# Patient Record
Sex: FEMALE | Race: BLACK OR AFRICAN AMERICAN | Employment: FULL TIME | URBAN - METROPOLITAN AREA
[De-identification: names, ages, dates, MRNs, and addresses within clinical notes are randomized per-mention and may not be internally consistent; named-entity substitution may affect disease eponyms.]

---

## 2019-09-17 ENCOUNTER — LAB ENCOUNTER (OUTPATIENT)
Dept: LAB | Age: 44
End: 2019-09-17
Attending: OBSTETRICS & GYNECOLOGY
Payer: COMMERCIAL

## 2019-09-17 DIAGNOSIS — N92.1 MENORRHAGIA WITH IRREGULAR CYCLE: ICD-10-CM

## 2019-09-17 PROBLEM — D21.9 FIBROIDS: Status: ACTIVE | Noted: 2019-09-17

## 2019-09-17 PROBLEM — D25.1 INTRAMURAL LEIOMYOMA OF UTERUS: Status: ACTIVE | Noted: 2019-09-17

## 2019-09-17 PROBLEM — D50.0 IRON DEFICIENCY ANEMIA DUE TO CHRONIC BLOOD LOSS: Status: ACTIVE | Noted: 2019-09-17

## 2019-09-17 PROBLEM — Z98.891 HISTORY OF C-SECTION: Status: ACTIVE | Noted: 2019-09-17

## 2019-09-17 LAB
ALBUMIN SERPL-MCNC: 4 G/DL (ref 3.4–5)
ALBUMIN/GLOB SERPL: 1.1 {RATIO} (ref 1–2)
ALP LIVER SERPL-CCNC: 56 U/L (ref 37–98)
ALT SERPL-CCNC: 21 U/L (ref 13–56)
ANION GAP SERPL CALC-SCNC: 7 MMOL/L (ref 0–18)
AST SERPL-CCNC: 20 U/L (ref 15–37)
BASOPHILS # BLD AUTO: 0.01 X10(3) UL (ref 0–0.2)
BASOPHILS NFR BLD AUTO: 0.2 %
BILIRUB SERPL-MCNC: 0.1 MG/DL (ref 0.1–2)
BUN BLD-MCNC: 16 MG/DL (ref 7–18)
BUN/CREAT SERPL: 19.8 (ref 10–20)
CALCIUM BLD-MCNC: 9.1 MG/DL (ref 8.5–10.1)
CHLORIDE SERPL-SCNC: 107 MMOL/L (ref 98–112)
CO2 SERPL-SCNC: 26 MMOL/L (ref 21–32)
CREAT BLD-MCNC: 0.81 MG/DL (ref 0.55–1.02)
DEPRECATED RDW RBC AUTO: 45.8 FL (ref 35.1–46.3)
EOSINOPHIL # BLD AUTO: 0.09 X10(3) UL (ref 0–0.7)
EOSINOPHIL NFR BLD AUTO: 1.8 %
ERYTHROCYTE [DISTWIDTH] IN BLOOD BY AUTOMATED COUNT: 12.8 % (ref 11–15)
GLOBULIN PLAS-MCNC: 3.5 G/DL (ref 2.8–4.4)
GLUCOSE BLD-MCNC: 153 MG/DL (ref 70–99)
HCT VFR BLD AUTO: 38.4 % (ref 35–48)
HGB BLD-MCNC: 12 G/DL (ref 12–16)
IMM GRANULOCYTES # BLD AUTO: 0.02 X10(3) UL (ref 0–1)
IMM GRANULOCYTES NFR BLD: 0.4 %
LYMPHOCYTES # BLD AUTO: 1.1 X10(3) UL (ref 1–4)
LYMPHOCYTES NFR BLD AUTO: 21.5 %
M PROTEIN MFR SERPL ELPH: 7.5 G/DL (ref 6.4–8.2)
MCH RBC QN AUTO: 30.3 PG (ref 26–34)
MCHC RBC AUTO-ENTMCNC: 31.3 G/DL (ref 31–37)
MCV RBC AUTO: 97 FL (ref 80–100)
MONOCYTES # BLD AUTO: 0.58 X10(3) UL (ref 0.1–1)
MONOCYTES NFR BLD AUTO: 11.4 %
NEUTROPHILS # BLD AUTO: 3.31 X10 (3) UL (ref 1.5–7.7)
NEUTROPHILS # BLD AUTO: 3.31 X10(3) UL (ref 1.5–7.7)
NEUTROPHILS NFR BLD AUTO: 64.7 %
OSMOLALITY SERPL CALC.SUM OF ELEC: 294 MOSM/KG (ref 275–295)
PLATELET # BLD AUTO: 256 10(3)UL (ref 150–450)
POTASSIUM SERPL-SCNC: 4.2 MMOL/L (ref 3.5–5.1)
RBC # BLD AUTO: 3.96 X10(6)UL (ref 3.8–5.3)
SODIUM SERPL-SCNC: 140 MMOL/L (ref 136–145)
WBC # BLD AUTO: 5.1 X10(3) UL (ref 4–11)

## 2019-09-17 PROCEDURE — 80053 COMPREHEN METABOLIC PANEL: CPT

## 2019-09-17 PROCEDURE — 85025 COMPLETE CBC W/AUTO DIFF WBC: CPT

## 2019-11-14 RX ORDER — SODIUM CHLORIDE, SODIUM LACTATE, POTASSIUM CHLORIDE, CALCIUM CHLORIDE 600; 310; 30; 20 MG/100ML; MG/100ML; MG/100ML; MG/100ML
INJECTION, SOLUTION INTRAVENOUS CONTINUOUS
Status: CANCELLED | OUTPATIENT
Start: 2019-11-14

## 2019-11-21 ENCOUNTER — ANESTHESIA EVENT (OUTPATIENT)
Dept: SURGERY | Facility: HOSPITAL | Age: 44
End: 2019-11-21
Payer: COMMERCIAL

## 2019-11-21 ENCOUNTER — ANESTHESIA (OUTPATIENT)
Dept: SURGERY | Facility: HOSPITAL | Age: 44
End: 2019-11-21
Payer: COMMERCIAL

## 2019-11-21 ENCOUNTER — HOSPITAL ENCOUNTER (OUTPATIENT)
Facility: HOSPITAL | Age: 44
Setting detail: HOSPITAL OUTPATIENT SURGERY
Discharge: HOME | End: 2019-11-21
Attending: OBSTETRICS & GYNECOLOGY | Admitting: OBSTETRICS & GYNECOLOGY
Payer: COMMERCIAL

## 2019-11-21 VITALS
OXYGEN SATURATION: 96 % | RESPIRATION RATE: 18 BRPM | TEMPERATURE: 97 F | HEART RATE: 65 BPM | DIASTOLIC BLOOD PRESSURE: 72 MMHG | SYSTOLIC BLOOD PRESSURE: 107 MMHG | WEIGHT: 185 LBS | BODY MASS INDEX: 34.93 KG/M2 | HEIGHT: 61 IN

## 2019-11-21 PROBLEM — Z98.890 S/P LAPAROSCOPY: Status: ACTIVE | Noted: 2019-11-21

## 2019-11-21 PROCEDURE — 81025 URINE PREGNANCY TEST: CPT | Performed by: OBSTETRICS & GYNECOLOGY

## 2019-11-21 PROCEDURE — 0U598ZZ DESTRUCTION OF UTERUS, VIA NATURAL OR ARTIFICIAL OPENING ENDOSCOPIC: ICD-10-PCS | Performed by: OBSTETRICS & GYNECOLOGY

## 2019-11-21 PROCEDURE — 88305 TISSUE EXAM BY PATHOLOGIST: CPT | Performed by: OBSTETRICS & GYNECOLOGY

## 2019-11-21 RX ORDER — MIDAZOLAM HYDROCHLORIDE 1 MG/ML
1 INJECTION INTRAMUSCULAR; INTRAVENOUS EVERY 5 MIN PRN
Status: DISCONTINUED | OUTPATIENT
Start: 2019-11-21 | End: 2019-11-21

## 2019-11-21 RX ORDER — ROCURONIUM BROMIDE 10 MG/ML
INJECTION, SOLUTION INTRAVENOUS AS NEEDED
Status: DISCONTINUED | OUTPATIENT
Start: 2019-11-21 | End: 2019-11-21 | Stop reason: SURG

## 2019-11-21 RX ORDER — SCOLOPAMINE TRANSDERMAL SYSTEM 1 MG/1
1 PATCH, EXTENDED RELEASE TRANSDERMAL ONCE
Status: DISCONTINUED | OUTPATIENT
Start: 2019-11-21 | End: 2019-11-21

## 2019-11-21 RX ORDER — HYDROMORPHONE HYDROCHLORIDE 1 MG/ML
0.4 INJECTION, SOLUTION INTRAMUSCULAR; INTRAVENOUS; SUBCUTANEOUS EVERY 5 MIN PRN
Status: DISCONTINUED | OUTPATIENT
Start: 2019-11-21 | End: 2019-11-21

## 2019-11-21 RX ORDER — SODIUM CHLORIDE, SODIUM LACTATE, POTASSIUM CHLORIDE, CALCIUM CHLORIDE 600; 310; 30; 20 MG/100ML; MG/100ML; MG/100ML; MG/100ML
INJECTION, SOLUTION INTRAVENOUS CONTINUOUS
Status: DISCONTINUED | OUTPATIENT
Start: 2019-11-21 | End: 2019-11-21

## 2019-11-21 RX ORDER — HYDROCODONE BITARTRATE AND ACETAMINOPHEN 5; 325 MG/1; MG/1
2 TABLET ORAL AS NEEDED
Status: DISCONTINUED | OUTPATIENT
Start: 2019-11-21 | End: 2019-11-21

## 2019-11-21 RX ORDER — BUPIVACAINE HYDROCHLORIDE 5 MG/ML
INJECTION, SOLUTION EPIDURAL; INTRACAUDAL AS NEEDED
Status: DISCONTINUED | OUTPATIENT
Start: 2019-11-21 | End: 2019-11-21 | Stop reason: HOSPADM

## 2019-11-21 RX ORDER — ONDANSETRON 2 MG/ML
4 INJECTION INTRAMUSCULAR; INTRAVENOUS AS NEEDED
Status: DISCONTINUED | OUTPATIENT
Start: 2019-11-21 | End: 2019-11-21

## 2019-11-21 RX ORDER — ACETAMINOPHEN 500 MG
1000 TABLET ORAL ONCE AS NEEDED
Status: DISCONTINUED | OUTPATIENT
Start: 2019-11-21 | End: 2019-11-21

## 2019-11-21 RX ORDER — IBUPROFEN 600 MG/1
600 TABLET ORAL EVERY 6 HOURS PRN
Qty: 30 TABLET | Refills: 1 | Status: SHIPPED | OUTPATIENT
Start: 2019-11-21

## 2019-11-21 RX ORDER — SCOLOPAMINE TRANSDERMAL SYSTEM 1 MG/1
1 PATCH, EXTENDED RELEASE TRANSDERMAL
Status: DISCONTINUED | OUTPATIENT
Start: 2019-11-21 | End: 2019-11-21

## 2019-11-21 RX ORDER — ACETAMINOPHEN 500 MG
1000 TABLET ORAL ONCE
COMMUNITY

## 2019-11-21 RX ORDER — GLYCOPYRROLATE 0.2 MG/ML
INJECTION, SOLUTION INTRAMUSCULAR; INTRAVENOUS AS NEEDED
Status: DISCONTINUED | OUTPATIENT
Start: 2019-11-21 | End: 2019-11-21 | Stop reason: SURG

## 2019-11-21 RX ORDER — HYDROCODONE BITARTRATE AND ACETAMINOPHEN 5; 325 MG/1; MG/1
1-2 TABLET ORAL EVERY 4 HOURS PRN
Qty: 15 TABLET | Refills: 0 | Status: ON HOLD | OUTPATIENT
Start: 2019-11-21 | End: 2019-12-09

## 2019-11-21 RX ORDER — LIDOCAINE HYDROCHLORIDE 10 MG/ML
INJECTION, SOLUTION EPIDURAL; INFILTRATION; INTRACAUDAL; PERINEURAL AS NEEDED
Status: DISCONTINUED | OUTPATIENT
Start: 2019-11-21 | End: 2019-11-21 | Stop reason: SURG

## 2019-11-21 RX ORDER — ONDANSETRON 2 MG/ML
INJECTION INTRAMUSCULAR; INTRAVENOUS AS NEEDED
Status: DISCONTINUED | OUTPATIENT
Start: 2019-11-21 | End: 2019-11-21 | Stop reason: SURG

## 2019-11-21 RX ORDER — HYDROCODONE BITARTRATE AND ACETAMINOPHEN 5; 325 MG/1; MG/1
1 TABLET ORAL AS NEEDED
Status: DISCONTINUED | OUTPATIENT
Start: 2019-11-21 | End: 2019-11-21

## 2019-11-21 RX ORDER — DEXAMETHASONE SODIUM PHOSPHATE 4 MG/ML
VIAL (ML) INJECTION AS NEEDED
Status: DISCONTINUED | OUTPATIENT
Start: 2019-11-21 | End: 2019-11-21 | Stop reason: SURG

## 2019-11-21 RX ORDER — LABETALOL HYDROCHLORIDE 5 MG/ML
INJECTION, SOLUTION INTRAVENOUS AS NEEDED
Status: DISCONTINUED | OUTPATIENT
Start: 2019-11-21 | End: 2019-11-21 | Stop reason: SURG

## 2019-11-21 RX ORDER — NALOXONE HYDROCHLORIDE 0.4 MG/ML
80 INJECTION, SOLUTION INTRAMUSCULAR; INTRAVENOUS; SUBCUTANEOUS AS NEEDED
Status: DISCONTINUED | OUTPATIENT
Start: 2019-11-21 | End: 2019-11-21

## 2019-11-21 RX ORDER — MEPERIDINE HYDROCHLORIDE 25 MG/ML
12.5 INJECTION INTRAMUSCULAR; INTRAVENOUS; SUBCUTANEOUS AS NEEDED
Status: DISCONTINUED | OUTPATIENT
Start: 2019-11-21 | End: 2019-11-21

## 2019-11-21 RX ORDER — NEOSTIGMINE METHYLSULFATE 1 MG/ML
INJECTION INTRAVENOUS AS NEEDED
Status: DISCONTINUED | OUTPATIENT
Start: 2019-11-21 | End: 2019-11-21 | Stop reason: SURG

## 2019-11-21 RX ORDER — ACETAMINOPHEN 500 MG
1000 TABLET ORAL ONCE
Status: DISCONTINUED | OUTPATIENT
Start: 2019-11-21 | End: 2019-11-21 | Stop reason: HOSPADM

## 2019-11-21 RX ADMIN — LABETALOL HYDROCHLORIDE 10 MG: 5 INJECTION, SOLUTION INTRAVENOUS at 10:55:00

## 2019-11-21 RX ADMIN — SODIUM CHLORIDE, SODIUM LACTATE, POTASSIUM CHLORIDE, CALCIUM CHLORIDE: 600; 310; 30; 20 INJECTION, SOLUTION INTRAVENOUS at 09:11:00

## 2019-11-21 RX ADMIN — LABETALOL HYDROCHLORIDE 10 MG: 5 INJECTION, SOLUTION INTRAVENOUS at 10:44:00

## 2019-11-21 RX ADMIN — ROCURONIUM BROMIDE 35 MG: 10 INJECTION, SOLUTION INTRAVENOUS at 09:19:00

## 2019-11-21 RX ADMIN — ONDANSETRON 4 MG: 2 INJECTION INTRAMUSCULAR; INTRAVENOUS at 10:58:00

## 2019-11-21 RX ADMIN — ROCURONIUM BROMIDE 15 MG: 10 INJECTION, SOLUTION INTRAVENOUS at 09:35:00

## 2019-11-21 RX ADMIN — LABETALOL HYDROCHLORIDE 10 MG: 5 INJECTION, SOLUTION INTRAVENOUS at 10:40:00

## 2019-11-21 RX ADMIN — ROCURONIUM BROMIDE 15 MG: 10 INJECTION, SOLUTION INTRAVENOUS at 10:11:00

## 2019-11-21 RX ADMIN — GLYCOPYRROLATE 0.4 MG: 0.2 INJECTION, SOLUTION INTRAMUSCULAR; INTRAVENOUS at 10:58:00

## 2019-11-21 RX ADMIN — DEXAMETHASONE SODIUM PHOSPHATE 8 MG: 4 MG/ML VIAL (ML) INJECTION at 09:21:00

## 2019-11-21 RX ADMIN — SODIUM CHLORIDE, SODIUM LACTATE, POTASSIUM CHLORIDE, CALCIUM CHLORIDE: 600; 310; 30; 20 INJECTION, SOLUTION INTRAVENOUS at 11:04:00

## 2019-11-21 RX ADMIN — NEOSTIGMINE METHYLSULFATE 3 MG: 1 INJECTION INTRAVENOUS at 10:58:00

## 2019-11-21 RX ADMIN — LIDOCAINE HYDROCHLORIDE 50 MG: 10 INJECTION, SOLUTION EPIDURAL; INFILTRATION; INTRACAUDAL; PERINEURAL at 09:19:00

## 2019-11-21 NOTE — H&P
[address] [place] [address] Patient Status:  Hospital Outpatient Surgery    10/8/1975 MRN [id number]   [facility] SURGERY Attending Elizabeth Clay MD   Hosp Day # 0 PCP Gin Smith MD     SUBJECTIVE: menstrual period 11/09/2019. No intake or output data in the 24 hours ending 11/20/19 2118    Physical Exam:  General: Alert, orientated x3. .  Vital Signs:  Height 61\", weight 185 lb, last menstrual period 11/09/2019. . VSS Afebrile  HEENT: Exam is unrem x10(3) uL 1.10    Monocyte Absolute  0.10 - 1.00 x10(3) uL 0.58    Eosinophil Absolute  0.00 - 0.70 x10(3) uL 0.09    Basophil Absolute  0.00 - 0.20 x10(3) uL 0.01    Immature Granulocyte Absolute  0.00 - 1.00 x10(3) uL 0.02    Neutrophil %  % 64.7    Lymp

## 2019-11-21 NOTE — OPERATIVE REPORT
OPERATIVE REPORT      PREOPERATIVE DIAGNOSIS:     1.      Fibroid uterus - intramural and subserosal     2.      Bloating  3.       Endometrial Polyp  POSTOPERATIVE DIAGNOSIS:     1.      Fibroid uterus - submucosal, transmural, intramural and subserosal.  dependent drainage. An exam under anesthesia reveals a SOPHIA uterus globular fibroid uterus. A weighted speculum was placed in the vagina and the cervix was grasped with a single toothed tenaculum.  Local anesthetic was placed in the paracervical region using uterus with the previously mentioned fibroids.  From side to side the uterus was mapped and all pertinent findings were noted.  The myomas were categorized by type and measured in 3 separate perpendicular planes, both transverse, sagittal, and anterior-pos The base of the fibroid then was bleeding from an area that was hit inadvertently with the lap scopic trocar and this was treated to a depth of 2 cm and deployed 1 cm the total amount of energy just applied to this fibroid was 7-1/2 minutes.     Fibroid was well.  The Perez and uterine manipulator were removed from the pelvis without bleeding.  The patient was moved to the supine position, awakened, and transferred to the recovery room in stable condition.      COMPLICATIONS:  None.

## 2019-11-21 NOTE — ANESTHESIA PROCEDURE NOTES
Airway  Urgency: elective      General Information and Staff    Patient location during procedure: OR  Anesthesiologist: Elena Martin MD  Resident/CRNA: Caitlin Kirkpatrick CRNA  Performed: CRNA     Indications and Patient Condition  Indications for

## 2019-11-21 NOTE — ANESTHESIA POSTPROCEDURE EVALUATION
[address] [facility] Patient Status:  Hospital Outpatient Surgery   Age/Gender 45year old female MRN [id number]   [facility] SURGERY Attending Mary Grace Garrett MD   Hosp Day # 0 PCP Jose Enrique Marrero MD       Anesthesia

## 2019-11-21 NOTE — INTERVAL H&P NOTE
Patient requesting Hysteroscopy, D&C to remove a polyp. The endometrial biopsy pathology report suggested the tissue looked polypoid. No polyps noted on ultrasound. Agreed to look inside uterus and do a D&C. RIsks and complications reviewed.

## 2019-11-21 NOTE — DISCHARGE SUMMARY
[facility]  Discharge Summary    [address] Patient Status:  Hospital Outpatient Surgery    10/8/1975 MRN [id number]   Location [facility] Attending Javed Parker MD   Hosp Day # 0 BRANDO Vann heavy lifting    Alona Prather  11/21/2019  11:43 AM

## 2019-11-21 NOTE — ANESTHESIA PREPROCEDURE EVALUATION
PRE-OP EVALUATION    Patient Name: Esperanza Alcantara    Pre-op Diagnosis: Fibroid Uterus, Menorrhagia    Procedure(s):  Laparoscopic Radiofrequency Ablation of Fibroids Andrew Lowry    Surgeon(s) and Role:     Kareem Cordova MD - Primary    Pre-op 09/17/2019    MCH 30.3 09/17/2019    MCHC 31.3 09/17/2019    RDW 12.8 09/17/2019    .0 09/17/2019     Lab Results   Component Value Date     09/17/2019    K 4.2 09/17/2019     09/17/2019    CO2 26.0 09/17/2019    BUN 16 09/17/2019    CRE

## 2019-11-25 PROCEDURE — 87147 CULTURE TYPE IMMUNOLOGIC: CPT | Performed by: OBSTETRICS & GYNECOLOGY

## 2019-11-25 PROCEDURE — 87086 URINE CULTURE/COLONY COUNT: CPT | Performed by: OBSTETRICS & GYNECOLOGY

## 2019-11-29 ENCOUNTER — HOSPITAL ENCOUNTER (EMERGENCY)
Facility: HOSPITAL | Age: 44
Discharge: STILL ADMITTED OR ADMINISTRATIVE | DRG: 983 | End: 2019-11-30
Payer: COMMERCIAL

## 2019-11-29 DIAGNOSIS — Z90.710 S/P HYSTERECTOMY: ICD-10-CM

## 2019-11-29 DIAGNOSIS — R50.9 FEVER, UNSPECIFIED FEVER CAUSE: Primary | ICD-10-CM

## 2019-11-30 ENCOUNTER — HOSPITAL ENCOUNTER (INPATIENT)
Facility: HOSPITAL | Age: 44
LOS: 9 days | Discharge: HOME | DRG: 983 | End: 2019-12-09
Attending: OBSTETRICS & GYNECOLOGY | Admitting: OBSTETRICS & GYNECOLOGY
Payer: COMMERCIAL

## 2019-11-30 PROBLEM — R50.9 FEVER: Status: ACTIVE | Noted: 2019-11-30

## 2019-11-30 PROCEDURE — 87086 URINE CULTURE/COLONY COUNT: CPT | Performed by: OBSTETRICS & GYNECOLOGY

## 2019-11-30 PROCEDURE — 82728 ASSAY OF FERRITIN: CPT | Performed by: OBSTETRICS & GYNECOLOGY

## 2019-11-30 PROCEDURE — 81001 URINALYSIS AUTO W/SCOPE: CPT | Performed by: OBSTETRICS & GYNECOLOGY

## 2019-11-30 PROCEDURE — 80053 COMPREHEN METABOLIC PANEL: CPT | Performed by: OBSTETRICS & GYNECOLOGY

## 2019-11-30 PROCEDURE — 85007 BL SMEAR W/DIFF WBC COUNT: CPT | Performed by: OBSTETRICS & GYNECOLOGY

## 2019-11-30 PROCEDURE — 85025 COMPLETE CBC W/AUTO DIFF WBC: CPT | Performed by: OBSTETRICS & GYNECOLOGY

## 2019-11-30 PROCEDURE — 85027 COMPLETE CBC AUTOMATED: CPT | Performed by: OBSTETRICS & GYNECOLOGY

## 2019-11-30 RX ORDER — ACETAMINOPHEN 500 MG
1000 TABLET ORAL EVERY 6 HOURS PRN
Status: DISCONTINUED | OUTPATIENT
Start: 2019-11-30 | End: 2019-12-05 | Stop reason: HOSPADM

## 2019-11-30 RX ORDER — DEXTROSE, SODIUM CHLORIDE, SODIUM LACTATE, POTASSIUM CHLORIDE, AND CALCIUM CHLORIDE 5; .6; .31; .03; .02 G/100ML; G/100ML; G/100ML; G/100ML; G/100ML
INJECTION, SOLUTION INTRAVENOUS CONTINUOUS
Status: DISCONTINUED | OUTPATIENT
Start: 2019-11-30 | End: 2019-12-05 | Stop reason: HOSPADM

## 2019-11-30 RX ORDER — SODIUM CHLORIDE 9 MG/ML
INJECTION, SOLUTION INTRAVENOUS CONTINUOUS
Status: ACTIVE | OUTPATIENT
Start: 2019-11-30 | End: 2019-11-30

## 2019-11-30 RX ORDER — HYDROMORPHONE HYDROCHLORIDE 1 MG/ML
0.5 INJECTION, SOLUTION INTRAMUSCULAR; INTRAVENOUS; SUBCUTANEOUS EVERY 30 MIN PRN
Status: CANCELLED | OUTPATIENT
Start: 2019-11-30 | End: 2019-11-30

## 2019-11-30 RX ORDER — ACETAMINOPHEN 500 MG
500 TABLET ORAL EVERY 6 HOURS PRN
Status: DISCONTINUED | OUTPATIENT
Start: 2019-11-30 | End: 2019-12-05 | Stop reason: HOSPADM

## 2019-11-30 RX ORDER — SODIUM CHLORIDE 9 MG/ML
INJECTION, SOLUTION INTRAVENOUS CONTINUOUS
Status: CANCELLED | OUTPATIENT
Start: 2019-11-30 | End: 2019-11-30

## 2019-11-30 RX ORDER — HYDROMORPHONE HYDROCHLORIDE 1 MG/ML
0.5 INJECTION, SOLUTION INTRAMUSCULAR; INTRAVENOUS; SUBCUTANEOUS EVERY 30 MIN PRN
Status: ACTIVE | OUTPATIENT
Start: 2019-11-30 | End: 2019-11-30

## 2019-11-30 RX ORDER — ONDANSETRON 2 MG/ML
4 INJECTION INTRAMUSCULAR; INTRAVENOUS EVERY 4 HOURS PRN
Status: CANCELLED | OUTPATIENT
Start: 2019-11-30

## 2019-11-30 RX ORDER — ONDANSETRON 2 MG/ML
4 INJECTION INTRAMUSCULAR; INTRAVENOUS EVERY 4 HOURS PRN
Status: DISCONTINUED | OUTPATIENT
Start: 2019-11-30 | End: 2019-12-05 | Stop reason: HOSPADM

## 2019-11-30 RX ORDER — CLINDAMYCIN PHOSPHATE 900 MG/50ML
900 INJECTION INTRAVENOUS EVERY 8 HOURS
Status: DISCONTINUED | OUTPATIENT
Start: 2019-11-30 | End: 2019-11-30

## 2019-11-30 NOTE — PROGRESS NOTES
NURSING ADMISSION NOTE      Patient admitted via [address] from [facility].   Oriented to room. Safety precautions initiated. Bed in low position. Call light in reach. Dr. Marlyn Mccoy notified of pt arrival.   Verbal admission orders received.      Pt

## 2019-11-30 NOTE — PROGRESS NOTES
Admit Note    45year old black female G 1 P 1001 LMP:  ? Triaged in [address] after calling to tell me she had a temperature of 104 F. Work up there showed an elevated WBC with a left shift. Imaging of the pelvis did not reveal an abscess.   I accepted >/= 126 mg/dL: Provisional Diagnosis of Diabetes   70 - 99 mg/dL [facility]     Urea Nitrogen 8 7 - 18 mg/dL [facility]     BUN/Creat Ratio 9.3 (L) 12.0 - 20.0 [facility]     Creatinine 0.86 0.51 - 1.17 mg/dL [facility] Total Out     200 0  Total Out     I/O Net    169 -200 0  I/O Net        Imaging not available in Epic. Told it was nonspecific. I requested triple antibiotics which was given there before transfer.         Scheduled  Scheduled   Medication Order 11/2 320 mg (rounded from 315.5 mg = 5 mg/kg × 63.1 kg Adjusted weight), IVPB, Administer over 60 Minutes, ONCE, Sat 11/30/19 at 0030, For 1 dose, Over 1 hour.  Check for orders for gentamicin levels,        0115Started^11/30 0115^Provider: Lul Mcnair RN^D

## 2019-11-30 NOTE — CONSULTS
INFECTIOUS DISEASE CONSULT NOTE    Matias Lewis Patient Status:  Inpatient    10/8/1975 MRN [id number]   [facility] 3NW-A Attending Doni Pizano MD   Hosp Day # 0 PCP Oral, Q6H PRN **OR** acetaminophen (TYLENOL EXTRA STRENGTH) tab 1,000 mg, 1,000 mg, Oral, Q6H PRN  •  Gentamicin Sulfate (GARAMYCIN) 440 mg in sodium chloride 0.9% 100 mL IVPB, 7 mg/kg (Adjusted), Intravenous, Once    Review of Systems:    Completed.  See p .0   NEUT 77   LYMPH 4   MON 6   EOS 0     Recent Labs   Lab 11/30/19  0528   *   BUN 7   CREATSERUM 0.75   GFRAA 114   GFRNAA 99   CA 8.5   ALB 2.4*      K 3.9      CO2 26.0   ALKPHO 106*   AST 16   ALT 24   BILT 0.4   TP 6.6

## 2019-11-30 NOTE — CONSULTS
[address] [facility] Dosing Service    Initial Pharmacokinetic Consult for Aminoglycoside Dosing      Maggie Trevino is a 45year old female who is being treated for fever of unknown origin. Pharmacy has been asked to dose gentamicin  by Dr. Nickie Dance.     She i in her care.     Isidoro Smith, PharmD  11/30/2019  8:10 AM

## 2019-11-30 NOTE — PLAN OF CARE
Problem: Patient/Family Goals  Goal: Patient/Family Long Term Goal  Description  Patient's Long Term Goal: DISCHARGE HOME    Interventions:  - PAIN TOLERABLE  -TOLERATING DIET  -RETURN TO PREVIOUS ADL'S  - See additional Care Plan goals for specific inte preferences  - Enhance eating environment  Outcome: Progressing  Goal: Achieves appropriate nutritional intake (bariatric)  Description  INTERVENTIONS:  - Monitor for over-consumption  - Identify factors contributing to increased intake, treat as appropria

## 2019-11-30 NOTE — PROGRESS NOTES
PATIENT HAS IV FLUIDS AT [facility], IV ZOSYN SCHEDULED, ON ROOM AIR, TOLERATING A REGULAR DIET, VOIDING WELL, HAS SOME REMAINING VAGINAL BLEEDING FROM ORIGINAL SURGERY, DENIES PAIN, INCISIONS ARE C/D/I, URINE SENT FOR CULTURE, AMBULATES INDEPENDENTLY, TEMP MAX 9

## 2019-11-30 NOTE — H&P
[facility]  History & Physical - Interim     Kirstie Martinez Patient Status:  Inpatient    10/8/1975 MRN [id number]   [facility] 3NW-A Attending Monica Chaney MD   Hosp Day # 0 PCP Perla Menendez MD     SUBJECTIVE:    Re Date   •      • LAPAROSCOPIC ACESSA PROCEDURE N/A 2019    Performed by Saumya Nina MD at [address]       Past OB History:  OB History    Para Term  AB Living   1 1 1     1   SAB TAB Ectopic Multiple Live Births murmur. Abdomen:  Good bowel sounds.,  Incisions healing well. Soft, round,  non-distended, uterus tender with deep palpation put not rebound or increase in warmth. Pelvic:deferred  Extremities:  No lower extremity edema noted.   Without clubbing or cya

## 2019-12-01 PROCEDURE — 85025 COMPLETE CBC W/AUTO DIFF WBC: CPT | Performed by: OBSTETRICS & GYNECOLOGY

## 2019-12-01 NOTE — PROGRESS NOTES
[facility]  Progress Note    Alma Delia Alvarado Patient Status:  Inpatient    10/8/1975 MRN [id number]   [facility] 3NW-A Attending Mary Grace Garrett MD   Hosp Day # 1 PCP Jose Enrique Marrero MD     Subjective:  POD: 10 (HD:  2) - f due to chronic blood loss     Fibroid uterus - submucous, transmural, intramural and subserosal.      Menorrhagia with irregular cycle     S/P Op. laparoscopy - RF ablation of fiborids, HS, D&C 2019     Fever        Pinkey Catherine  12/1/2019  10:25 AM

## 2019-12-01 NOTE — PROGRESS NOTES
INFECTIOUS DISEASE PROGRESS NOTE    Pedro Pablo Lomeli Patient Status:  Inpatient    10/8/1975 MRN [id number]   [facility] 3NW-A Attending Kimmie Durán MD   Hosp Day # 1 PCP 268.0 272.0   NEUT 77  --    LYMPH 4  --    MON 6  --    EOS 0  --      Recent Labs   Lab 11/30/19  0528   *   BUN 7   CREATSERUM 0.75   GFRAA 114   GFRNAA 99   CA 8.5   ALB 2.4*      K 3.9      CO2 26.0   ALKPHO 106*   AST 16   ALT 24

## 2019-12-01 NOTE — PROGRESS NOTES
PATIENT HAS IV FLUIDS AT [facility], IV ZOSYN SCHEDULED, AMBULATES INDEPENDENTLY, TOLERATING A REGULAR DIET, ON ROOM AIR, VOIDING WELL, STILL HAS SOME VAGINAL BLEEDING, INCISIONS ARE C/D/I, DENIES PAIN, HAD INTERMITTENT NAUSEA THIS MORNING-ZOFRAN GIVEN, AFEBRILE.

## 2019-12-01 NOTE — PLAN OF CARE
Problem: Patient/Family Goals  Goal: Patient/Family Long Term Goal  Description  Patient's Long Term Goal: DISCHARGE HOME    Interventions:  - PAIN TOLERABLE  -TOLERATING DIET  -RETURN TO PREVIOUS ADL'S  - See additional Care Plan goals for specific inte preferences  - Enhance eating environment  Outcome: Progressing  Goal: Achieves appropriate nutritional intake (bariatric)  Description  INTERVENTIONS:  - Monitor for over-consumption  - Identify factors contributing to increased intake, treat as appropria severity of pain and evaluate response  - Implement non-pharmacological measures as appropriate and evaluate response  - Consider cultural and social influences on pain and pain management  - Manage/alleviate anxiety  - Utilize distraction and/or relaxatio

## 2019-12-02 ENCOUNTER — APPOINTMENT (OUTPATIENT)
Dept: CT IMAGING | Facility: HOSPITAL | Age: 44
DRG: 983 | End: 2019-12-02
Attending: INTERNAL MEDICINE
Payer: COMMERCIAL

## 2019-12-02 PROCEDURE — 36410 VNPNXR 3YR/> PHY/QHP DX/THER: CPT

## 2019-12-02 PROCEDURE — 85025 COMPLETE CBC W/AUTO DIFF WBC: CPT | Performed by: INTERNAL MEDICINE

## 2019-12-02 PROCEDURE — 80048 BASIC METABOLIC PNL TOTAL CA: CPT | Performed by: INTERNAL MEDICINE

## 2019-12-02 PROCEDURE — 74177 CT ABD & PELVIS W/CONTRAST: CPT | Performed by: INTERNAL MEDICINE

## 2019-12-02 PROCEDURE — 76937 US GUIDE VASCULAR ACCESS: CPT

## 2019-12-02 PROCEDURE — 86140 C-REACTIVE PROTEIN: CPT | Performed by: INTERNAL MEDICINE

## 2019-12-02 RX ORDER — SODIUM CHLORIDE 0.9 % (FLUSH) 0.9 %
10 SYRINGE (ML) INJECTION EVERY 12 HOURS
Status: DISCONTINUED | OUTPATIENT
Start: 2019-12-02 | End: 2019-12-05 | Stop reason: HOSPADM

## 2019-12-02 NOTE — PROGRESS NOTES
INFECTIOUS DISEASE PROGRESS NOTE    Kirstie Martinez Patient Status:  Inpatient    10/8/1975 MRN [id number]   [facility] 3NW-A Attending Monica Chaney MD   Hosp Day # 2 PCP 7.3*   HCT 23.6* 22.7* 23.3*   MCV 94.8 95.8 94.3   MCH 30.5 29.5 29.6   MCHC 32.2 30.8* 31.3   RDW 13.1 13.4 13.2   NEPRELIM 16.73* 13.09* 12.25*   WBC 20.5* 15.9* 15.0*   .0 272.0 347.0   NEUT 77  --   --    LYMPH 4  --   --    MON 6  --   --    E

## 2019-12-02 NOTE — PLAN OF CARE
Problem: GASTROINTESTINAL - ADULT  Goal: Minimal or absence of nausea and vomiting  Description  INTERVENTIONS:  - Maintain adequate hydration with IV or PO as ordered and tolerated  - Nasogastric tube to low intermittent suction as ordered  - Evaluate e bladder scan as needed  - Follow urinary retention protocol/standard of care  - Consider collaborating with pharmacy to review patient's medication profile  - Implement strategies to promote bladder emptying  Outcome: Progressing     Problem: SKIN/TISSUE I period  Description  INTERVENTIONS  - Monitor WBC  - Administer growth factors as ordered  - Implement neutropenic guidelines  Outcome: Progressing     Problem: Patient/Family Goals  Goal: Patient/Family Long Term Goal  Description  Patient's [address] [facility]

## 2019-12-02 NOTE — PROGRESS NOTES
Pt resting comfortably at this time. Complains of intermittent mild abdominal pain. Declines medication at this time. Denies nausea this time. Complains of decreased appetite. States last BM was today. Voiding.  Complains of blood tinged mucous discharge to

## 2019-12-02 NOTE — PAYOR COMM NOTE
--------------  ADMISSION REVIEW     Payor: [address] [address]  Subscriber #:  [id number]  Authorization Number: [id number]    Admit date: 11/30/19  Admit time: Jn Collins 61       Admitting Physician: Alicia Morrison MD  Attending Physician:  Marialuisa Licea 11/30/2019 1157  Last data filed at 11/30/2019 1151  Gross per 24 hour   Intake 569 ml   Output 400 ml   Net 169 ml       Physical Exam:    Vital Signs:  Blood pressure 100/58, pulse 100, temperature 99.4 °F (37.4 °C), temperature source Oral, resp.  rate 1 7. 0  HCT 22.7  RBC 2.37  NEUTROPHILS 13.09      OB/GYN -     Subjective:  POD: 10 (HD:  2) - feels good. Now vaginal discharge no lighter and more mucous than bloody. No orthostatic symptoms despite Hb = 7. Tolerating diet well. (+) flatulence.   Selestino Lara

## 2019-12-02 NOTE — PLAN OF CARE
Patient is alert and orientated x4. RA. Voids. No N/V diet tolerated, decreased appetite. Lory pad and mesh panties in place, with small amount of blood tinged discharge. Lap sites x4 with skin glue. Denies pain. Up ad sam. IV fluids/abx infusing.  Plan of (undernourished)  Description  INTERVENTIONS:  - Monitor percentage of each meal consumed  - Identify factors contributing to decreased intake, treat as appropriate  - Assist with meals as needed  - Monitor I&O, WT and lab values  - Obtain nutritional cons Initiate interventions, skin care algorithm/standards of care as needed  Outcome: Progressing  Goal: Incision(s), wounds(s) or drain site(s) healing without S/S of infection  Description  INTERVENTIONS:  - Assess and document risk factors for pressure ulce

## 2019-12-02 NOTE — CDS QUERY
Clinical Significance – Lab Results Associated with Blood Loss  CLINICAL DOCUMENTATION CLARIFICATION FORM  Dear Doctor:  Clinical information (provided below) suggests blood loss and abnormal blood counts.  For accurate ICD-10-CM code assignment to reflect

## 2019-12-02 NOTE — PROGRESS NOTES
[facility]  Progress Note    Dhaval Cohen Patient Status:  Inpatient    10/8/1975 MRN [id number]   [facility] 3NW-A Attending Brandon Rider MD   Hosp Day # 2 PCP Howie Pride MD     Subjective:  POD: 11(HD #3)- - fe

## 2019-12-03 PROCEDURE — 85025 COMPLETE CBC W/AUTO DIFF WBC: CPT | Performed by: INTERNAL MEDICINE

## 2019-12-03 PROCEDURE — 86140 C-REACTIVE PROTEIN: CPT | Performed by: INTERNAL MEDICINE

## 2019-12-03 RX ORDER — CHOLECALCIFEROL (VITAMIN D3) 25 MCG
1 TABLET,CHEWABLE ORAL DAILY
Status: DISCONTINUED | OUTPATIENT
Start: 2019-12-03 | End: 2019-12-05 | Stop reason: HOSPADM

## 2019-12-03 NOTE — PROGRESS NOTES
INFECTIOUS DISEASE PROGRESS NOTE    Niko Tyler Patient Status:  Inpatient    10/8/1975 MRN [id number]   [facility] 3NW-A Attending Kevin Diggs MD   Hosp Day # 3 PCP 11/30/19  0528 12/01/19  0541 12/02/19  0534 12/03/19  0529   RBC 2.49* 2.37* 2.47* 2.45*   HGB 7.6* 7.0* 7.3* 7.1*   HCT 23.6* 22.7* 23.3* 23.0*   MCV 94.8 95.8 94.3 93.9   MCH 30.5 29.5 29.6 29.0   MCHC 32.2 30.8* 31.3 30.9*   RDW 13.1 13.4 13.2 13.6   N

## 2019-12-03 NOTE — PAYOR COMM NOTE
--------------  CONTINUED STAY REVIEW    Payor: Melissa [facility]  Subscriber #:  [id number]  Authorization Number: [id number]    Admit date: 11/30/19  Admit time: 6709    Admitting Physician: Armando Farah MD  Attending Physician:  Claudio Blum, Chr

## 2019-12-03 NOTE — PLAN OF CARE
Problem: Patient/Family Goals  Goal: Patient/Family Short Term Goal  Description  Patient's Short Term Goal: PREPARE FOR DISCHARGE HOME    Interventions:   - ADVANCE DIET AS TOLERATED  -TRANSITION IV TO ORAL PAIN MEDICATION AND ANTIBIOTICS  -ENCOURAGE AM Monitor I&O, WT and lab values  - Obtain nutritional consult as needed  - Evaluate psychosocial factors contributing to over-consumption  Outcome: Progressing     Problem: GENITOURINARY - ADULT  Goal: Absence of urinary retention  Description  INTERVENTION techniques  - Monitor for opioid side effects  - Notify MD/LIP if interventions unsuccessful or patient reports new pain  - Anticipate increased pain with activity and pre-medicate as appropriate  Outcome: Progressing     Problem: [address]

## 2019-12-03 NOTE — PLAN OF CARE
Pt alert and orientatedx4. Room air. Encouraged to use IS. Low grade fever this shift, tylenol given. Regular diet. Voiding. Passing gas. Up ad sam. Old lap sites to abdomen. Lory pad in place with scant pinkish output. IVF TKO. IV antibiotics.  Tylenol use nutritional intake (undernourished)  Description  INTERVENTIONS:  - Monitor percentage of each meal consumed  - Identify factors contributing to decreased intake, treat as appropriate  - Assist with meals as needed  - Monitor I&O, WT and lab values  - Obta as appropriate  - Initiate Pressure Ulcer prevention bundle as indicated  Outcome: Progressing     Problem: PAIN - ADULT  Goal: Verbalizes/displays adequate comfort level or patient's stated pain goal  Description  INTERVENTIONS:  - Encourage pt to monitor

## 2019-12-03 NOTE — PROGRESS NOTES
SPOKE WITH DR. RODRIGUES REGARDING CT SCAN OF ABDOMEN RESULTS BUT MIDWAY OF READING THE CT SCAN RESULTS ,HE TOLD WRITING RN TO INFORM AND RELAY THE RESULTS TO  TOMORROW.

## 2019-12-03 NOTE — PROGRESS NOTES
[facility]  Progress Note    Ilda Warren Patient Status:  Inpatient    10/8/1975 MRN [id number]   [facility] 3NW-A Attending Genaro Bosch MD   Hosp Day # 3 PCP Martha Mendez MD     Subjective:  POD: XII (HD # 4 - f transmitted to the [facility] ([address] [place] [address] of Radiology) Kavitha. Dale Mcintyre [address] ([address]) which includes the Dose Index Registry.     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      CONTRAST USE uterine segment there is a 28 by 32 mm focus of low attenuation could represent area of necrosis.  At the most anterior aspect of the lower uterine segment there is a intramural necrotic   fibroid measuring 36 x 47 mm with some anterior extension to the se

## 2019-12-04 ENCOUNTER — ANESTHESIA EVENT (OUTPATIENT)
Dept: SURGERY | Facility: HOSPITAL | Age: 44
DRG: 983 | End: 2019-12-04
Payer: COMMERCIAL

## 2019-12-04 PROCEDURE — 86901 BLOOD TYPING SEROLOGIC RH(D): CPT | Performed by: OBSTETRICS & GYNECOLOGY

## 2019-12-04 PROCEDURE — 30233H1 TRANSFUSION OF NONAUTOLOGOUS WHOLE BLOOD INTO PERIPHERAL VEIN, PERCUTANEOUS APPROACH: ICD-10-PCS | Performed by: OBSTETRICS & GYNECOLOGY

## 2019-12-04 PROCEDURE — 85025 COMPLETE CBC W/AUTO DIFF WBC: CPT | Performed by: OBSTETRICS & GYNECOLOGY

## 2019-12-04 PROCEDURE — 86920 COMPATIBILITY TEST SPIN: CPT

## 2019-12-04 PROCEDURE — 30233N1 TRANSFUSION OF NONAUTOLOGOUS RED BLOOD CELLS INTO PERIPHERAL VEIN, PERCUTANEOUS APPROACH: ICD-10-PCS | Performed by: OBSTETRICS & GYNECOLOGY

## 2019-12-04 PROCEDURE — 36430 TRANSFUSION BLD/BLD COMPNT: CPT

## 2019-12-04 PROCEDURE — 86900 BLOOD TYPING SEROLOGIC ABO: CPT | Performed by: OBSTETRICS & GYNECOLOGY

## 2019-12-04 PROCEDURE — 86850 RBC ANTIBODY SCREEN: CPT | Performed by: OBSTETRICS & GYNECOLOGY

## 2019-12-04 RX ORDER — DEXTROSE AND SODIUM CHLORIDE 5; .45 G/100ML; G/100ML
INJECTION, SOLUTION INTRAVENOUS CONTINUOUS
Status: DISCONTINUED | OUTPATIENT
Start: 2019-12-05 | End: 2019-12-05 | Stop reason: HOSPADM

## 2019-12-04 RX ORDER — SODIUM CHLORIDE 9 MG/ML
INJECTION, SOLUTION INTRAVENOUS ONCE
Status: COMPLETED | OUTPATIENT
Start: 2019-12-04 | End: 2019-12-04

## 2019-12-04 NOTE — PAYOR COMM NOTE
--------------  CONTINUED STAY REVIEW    Payor: Melissa [facility]  Subscriber #:  [id number]  Authorization Number: [id number]    Admit date: 11/30/19  Admit time: 1880    Admitting Physician: Jd Douglas MD  Attending Physician:  Martin Forrest, Chr

## 2019-12-04 NOTE — PLAN OF CARE
Orders received for 1 unit PRBC's. Consent signed. Type and screen ordered. Pt requesting clarification of surgery for tomorrow. Dr. Lynsey Gan called and pt spoke with Dr. Lynsey Gan.  Pt tearful after speaking to him and states, \"it's just alot of information to p

## 2019-12-04 NOTE — PROGRESS NOTES
External entry - phone visit with patient. WBC going up again. Reiterated the need for hysterectomy with patient. She now agrees  Reviewed CT with radiologist last night  Reviewed treatment options with ID yesterday.     All agree removal of uterus is

## 2019-12-04 NOTE — PLAN OF CARE
Upon assessment, VSS, afebrile. Pt denies pain currently. Pt reports right sided abdominal pain with full bladder and immediately after urinating. Pt states, \"I'm trying to urinate frequently to avoid the pain\". Pt denies dysuria and urinary urgency.  Abd hydration with IV or PO as ordered and tolerated  - Evaluate effectiveness of GI medications  - Encourage mobilization and activity  - Obtain nutritional consult as needed  - Establish a toileting routine/schedule  - Consider collaborating with pharmacy to or drain site(s) healing without S/S of infection  Description  INTERVENTIONS:  - Assess and document risk factors for pressure ulcer development  - Assess and document skin integrity  - Assess and document dressing/incision, wound bed, drain sites and dale

## 2019-12-04 NOTE — PROGRESS NOTES
INFECTIOUS DISEASE PROGRESS NOTE    Erasmo Pradhan Patient Status:  Inpatient    10/8/1975 MRN [id number]   [facility] 3NW-A Attending Javed Parker MD   Hosp Day # 4 PCP wounds.     Laboratory Data:    Recent Labs   Lab 11/30/19  0528  12/02/19  0534 12/03/19  0529 12/04/19  0532   RBC 2.49*   < > 2.47* 2.45* 2.57*   HGB 7.6*   < > 7.3* 7.1* 7.5*   HCT 23.6*   < > 23.3* 23.0* 24.2*   MCV 94.8   < > 94.3 93.9 94.2   MCH 30.5

## 2019-12-04 NOTE — ANESTHESIA PREPROCEDURE EVALUATION
PRE-OP EVALUATION    Patient Name: John Grant    Pre-op Diagnosis: inpatient    Procedure(s):  TOTAL ABDOMINAL HYSTERECTOMY BILATERAL SALPINGECTOMIES    Surgeon(s) and Role:     Alicia Morrison MD - Primary    Pre-op vitals reviewed.   Tem 81 MG Oral Tab, Take 81 mg by mouth daily. , Disp: , Rfl:         Allergies: Chloroquine      Anesthesia Evaluation    Patient summary reviewed.     Anesthetic Complications  (-) history of anesthetic complications         GI/Hepatic/Renal    Negative GI/hep notable dental history. Pulmonary    Pulmonary exam normal.                 Other findings            ASA: 2   Plan: general  NPO status verified and patient meets guidelines.         Comment:  I explained intrinsic risks of general anesthesia, incl

## 2019-12-04 NOTE — PROGRESS NOTES
[facility]  Progress Note    Suzy Burdick Patient Status:  Inpatient    10/8/1975 MRN [id number]   [facility] 3NW-A Attending Jd Douglas MD   Hosp Day # 4 PCP Sherron Luong MD     Subjective:  POD: 13( HD # 4) - f

## 2019-12-05 ENCOUNTER — ANESTHESIA (OUTPATIENT)
Dept: SURGERY | Facility: HOSPITAL | Age: 44
DRG: 983 | End: 2019-12-05
Payer: COMMERCIAL

## 2019-12-05 PROCEDURE — 88307 TISSUE EXAM BY PATHOLOGIST: CPT | Performed by: OBSTETRICS & GYNECOLOGY

## 2019-12-05 PROCEDURE — 3E0T3BZ INTRODUCTION OF ANESTHETIC AGENT INTO PERIPHERAL NERVES AND PLEXI, PERCUTANEOUS APPROACH: ICD-10-PCS | Performed by: ANESTHESIOLOGY

## 2019-12-05 PROCEDURE — 81025 URINE PREGNANCY TEST: CPT | Performed by: OBSTETRICS & GYNECOLOGY

## 2019-12-05 PROCEDURE — 0UT70ZZ RESECTION OF BILATERAL FALLOPIAN TUBES, OPEN APPROACH: ICD-10-PCS | Performed by: OBSTETRICS & GYNECOLOGY

## 2019-12-05 PROCEDURE — 85025 COMPLETE CBC W/AUTO DIFF WBC: CPT | Performed by: OBSTETRICS & GYNECOLOGY

## 2019-12-05 PROCEDURE — 0UT90ZZ RESECTION OF UTERUS, OPEN APPROACH: ICD-10-PCS | Performed by: OBSTETRICS & GYNECOLOGY

## 2019-12-05 RX ORDER — ONDANSETRON 2 MG/ML
INJECTION INTRAMUSCULAR; INTRAVENOUS
Status: COMPLETED
Start: 2019-12-05 | End: 2019-12-05

## 2019-12-05 RX ORDER — SODIUM CHLORIDE, SODIUM LACTATE, POTASSIUM CHLORIDE, CALCIUM CHLORIDE 600; 310; 30; 20 MG/100ML; MG/100ML; MG/100ML; MG/100ML
INJECTION, SOLUTION INTRAVENOUS CONTINUOUS
Status: DISCONTINUED | OUTPATIENT
Start: 2019-12-05 | End: 2019-12-06

## 2019-12-05 RX ORDER — HYDROMORPHONE HYDROCHLORIDE 1 MG/ML
0.2 INJECTION, SOLUTION INTRAMUSCULAR; INTRAVENOUS; SUBCUTANEOUS EVERY 2 HOUR PRN
Status: DISCONTINUED | OUTPATIENT
Start: 2019-12-05 | End: 2019-12-09

## 2019-12-05 RX ORDER — HYDROCODONE BITARTRATE AND ACETAMINOPHEN 5; 325 MG/1; MG/1
2 TABLET ORAL AS NEEDED
Status: DISCONTINUED | OUTPATIENT
Start: 2019-12-05 | End: 2019-12-05 | Stop reason: HOSPADM

## 2019-12-05 RX ORDER — ONDANSETRON 2 MG/ML
INJECTION INTRAMUSCULAR; INTRAVENOUS AS NEEDED
Status: DISCONTINUED | OUTPATIENT
Start: 2019-12-05 | End: 2019-12-05 | Stop reason: SURG

## 2019-12-05 RX ORDER — HEPARIN SODIUM 5000 [USP'U]/ML
5000 INJECTION, SOLUTION INTRAVENOUS; SUBCUTANEOUS ONCE
Status: DISCONTINUED | OUTPATIENT
Start: 2019-12-05 | End: 2019-12-08

## 2019-12-05 RX ORDER — NEOSTIGMINE METHYLSULFATE 1 MG/ML
INJECTION INTRAVENOUS AS NEEDED
Status: DISCONTINUED | OUTPATIENT
Start: 2019-12-05 | End: 2019-12-05 | Stop reason: SURG

## 2019-12-05 RX ORDER — MEPERIDINE HYDROCHLORIDE 25 MG/ML
12.5 INJECTION INTRAMUSCULAR; INTRAVENOUS; SUBCUTANEOUS AS NEEDED
Status: DISCONTINUED | OUTPATIENT
Start: 2019-12-05 | End: 2019-12-05 | Stop reason: HOSPADM

## 2019-12-05 RX ORDER — DEXTROSE MONOHYDRATE 25 G/50ML
50 INJECTION, SOLUTION INTRAVENOUS
Status: DISCONTINUED | OUTPATIENT
Start: 2019-12-05 | End: 2019-12-05 | Stop reason: HOSPADM

## 2019-12-05 RX ORDER — ACETAMINOPHEN 10 MG/ML
1000 INJECTION, SOLUTION INTRAVENOUS EVERY 6 HOURS
Status: COMPLETED | OUTPATIENT
Start: 2019-12-05 | End: 2019-12-06

## 2019-12-05 RX ORDER — GLYCOPYRROLATE 0.2 MG/ML
INJECTION, SOLUTION INTRAMUSCULAR; INTRAVENOUS AS NEEDED
Status: DISCONTINUED | OUTPATIENT
Start: 2019-12-05 | End: 2019-12-05 | Stop reason: SURG

## 2019-12-05 RX ORDER — SCOLOPAMINE TRANSDERMAL SYSTEM 1 MG/1
PATCH, EXTENDED RELEASE TRANSDERMAL AS NEEDED
Status: DISCONTINUED | OUTPATIENT
Start: 2019-12-05 | End: 2019-12-05 | Stop reason: SURG

## 2019-12-05 RX ORDER — HYDROMORPHONE HYDROCHLORIDE 1 MG/ML
0.4 INJECTION, SOLUTION INTRAMUSCULAR; INTRAVENOUS; SUBCUTANEOUS EVERY 2 HOUR PRN
Status: DISCONTINUED | OUTPATIENT
Start: 2019-12-05 | End: 2019-12-09

## 2019-12-05 RX ORDER — KETOROLAC TROMETHAMINE 30 MG/ML
30 INJECTION, SOLUTION INTRAMUSCULAR; INTRAVENOUS EVERY 6 HOURS PRN
Status: DISCONTINUED | OUTPATIENT
Start: 2019-12-05 | End: 2019-12-05 | Stop reason: HOSPADM

## 2019-12-05 RX ORDER — NALOXONE HYDROCHLORIDE 0.4 MG/ML
80 INJECTION, SOLUTION INTRAMUSCULAR; INTRAVENOUS; SUBCUTANEOUS AS NEEDED
Status: DISCONTINUED | OUTPATIENT
Start: 2019-12-05 | End: 2019-12-05 | Stop reason: HOSPADM

## 2019-12-05 RX ORDER — ONDANSETRON 2 MG/ML
4 INJECTION INTRAMUSCULAR; INTRAVENOUS AS NEEDED
Status: DISCONTINUED | OUTPATIENT
Start: 2019-12-05 | End: 2019-12-05 | Stop reason: HOSPADM

## 2019-12-05 RX ORDER — HYDROMORPHONE HYDROCHLORIDE 1 MG/ML
INJECTION, SOLUTION INTRAMUSCULAR; INTRAVENOUS; SUBCUTANEOUS
Status: COMPLETED
Start: 2019-12-05 | End: 2019-12-05

## 2019-12-05 RX ORDER — HYDROMORPHONE HYDROCHLORIDE 1 MG/ML
0.4 INJECTION, SOLUTION INTRAMUSCULAR; INTRAVENOUS; SUBCUTANEOUS EVERY 5 MIN PRN
Status: DISCONTINUED | OUTPATIENT
Start: 2019-12-05 | End: 2019-12-05 | Stop reason: HOSPADM

## 2019-12-05 RX ORDER — KETOROLAC TROMETHAMINE 30 MG/ML
30 INJECTION, SOLUTION INTRAMUSCULAR; INTRAVENOUS EVERY 6 HOURS
Status: COMPLETED | OUTPATIENT
Start: 2019-12-05 | End: 2019-12-06

## 2019-12-05 RX ORDER — SODIUM CHLORIDE, SODIUM LACTATE, POTASSIUM CHLORIDE, CALCIUM CHLORIDE 600; 310; 30; 20 MG/100ML; MG/100ML; MG/100ML; MG/100ML
INJECTION, SOLUTION INTRAVENOUS CONTINUOUS PRN
Status: DISCONTINUED | OUTPATIENT
Start: 2019-12-05 | End: 2019-12-05 | Stop reason: SURG

## 2019-12-05 RX ORDER — HYDROCODONE BITARTRATE AND ACETAMINOPHEN 5; 325 MG/1; MG/1
1 TABLET ORAL AS NEEDED
Status: DISCONTINUED | OUTPATIENT
Start: 2019-12-05 | End: 2019-12-05 | Stop reason: HOSPADM

## 2019-12-05 RX ORDER — HYDROMORPHONE HYDROCHLORIDE 1 MG/ML
0.8 INJECTION, SOLUTION INTRAMUSCULAR; INTRAVENOUS; SUBCUTANEOUS EVERY 2 HOUR PRN
Status: DISCONTINUED | OUTPATIENT
Start: 2019-12-05 | End: 2019-12-09

## 2019-12-05 RX ORDER — KETOROLAC TROMETHAMINE 30 MG/ML
15 INJECTION, SOLUTION INTRAMUSCULAR; INTRAVENOUS EVERY 6 HOURS PRN
Status: DISCONTINUED | OUTPATIENT
Start: 2019-12-05 | End: 2019-12-05 | Stop reason: HOSPADM

## 2019-12-05 RX ORDER — CEFAZOLIN SODIUM 1 G/3ML
INJECTION, POWDER, FOR SOLUTION INTRAMUSCULAR; INTRAVENOUS AS NEEDED
Status: DISCONTINUED | OUTPATIENT
Start: 2019-12-05 | End: 2019-12-05 | Stop reason: SURG

## 2019-12-05 RX ORDER — SODIUM CHLORIDE, SODIUM LACTATE, POTASSIUM CHLORIDE, CALCIUM CHLORIDE 600; 310; 30; 20 MG/100ML; MG/100ML; MG/100ML; MG/100ML
INJECTION, SOLUTION INTRAVENOUS CONTINUOUS
Status: DISCONTINUED | OUTPATIENT
Start: 2019-12-05 | End: 2019-12-05 | Stop reason: HOSPADM

## 2019-12-05 RX ORDER — ROCURONIUM BROMIDE 10 MG/ML
INJECTION, SOLUTION INTRAVENOUS AS NEEDED
Status: DISCONTINUED | OUTPATIENT
Start: 2019-12-05 | End: 2019-12-05 | Stop reason: SURG

## 2019-12-05 RX ADMIN — NEOSTIGMINE METHYLSULFATE 4 MG: 1 INJECTION INTRAVENOUS at 16:37:00

## 2019-12-05 RX ADMIN — SODIUM CHLORIDE, SODIUM LACTATE, POTASSIUM CHLORIDE, CALCIUM CHLORIDE: 600; 310; 30; 20 INJECTION, SOLUTION INTRAVENOUS at 17:05:00

## 2019-12-05 RX ADMIN — SODIUM CHLORIDE, SODIUM LACTATE, POTASSIUM CHLORIDE, CALCIUM CHLORIDE: 600; 310; 30; 20 INJECTION, SOLUTION INTRAVENOUS at 15:17:00

## 2019-12-05 RX ADMIN — CEFAZOLIN SODIUM 2 G: 1 INJECTION, POWDER, FOR SOLUTION INTRAMUSCULAR; INTRAVENOUS at 15:01:00

## 2019-12-05 RX ADMIN — SCOLOPAMINE TRANSDERMAL SYSTEM 1 PATCH: 1 PATCH, EXTENDED RELEASE TRANSDERMAL at 14:37:00

## 2019-12-05 RX ADMIN — GLYCOPYRROLATE 0.6 MG: 0.2 INJECTION, SOLUTION INTRAMUSCULAR; INTRAVENOUS at 16:37:00

## 2019-12-05 RX ADMIN — ROCURONIUM BROMIDE 50 MG: 10 INJECTION, SOLUTION INTRAVENOUS at 14:42:00

## 2019-12-05 RX ADMIN — ONDANSETRON 4 MG: 2 INJECTION INTRAMUSCULAR; INTRAVENOUS at 14:54:00

## 2019-12-05 NOTE — ANESTHESIA PROCEDURE NOTES
Airway  Urgency: elective    Airway not difficult    General Information and Staff    Patient location during procedure: OR  Anesthesiologist: Moises Flores MD  Performed: anesthesiologist     Indications and Patient Condition  Indications for airway luz marina

## 2019-12-05 NOTE — ANESTHESIA PROCEDURE NOTES
Regional Block  Performed by: Rakan Galarza MD  Authorized by: Rakan Galarza MD       General Information and Staff    Start Time:  12/5/2019 2:47 PM  End Time:  12/5/2019 3:00 PM  Anesthesiologist:  Rakan Galarza MD  Performed by:   Anesthesiologist  Pa

## 2019-12-05 NOTE — OPERATIVE REPORT
PREOPERATIVE DIAGNOSIS: Fibroid Uterus           Pelvic Abscess          S/P Radiofrequency Ablation of Fibroids  POSTOPERATIVE DIAGNOSIS: Same          Pelvic Adhesions  PROCEDURE PERFORMED: Total Abdominal Hysterectomy bilateral salpingectomies,     SURG was used through the skin, sub-q, and fat down to the fascia. The fascia was then nicked in the midline and opened in a linear fashion using Cockers to elevate the fascia freeing up from its underlying dense connective tissue.  We entered the abdominal cavi subcutaneous tissue. The skin was closed with steel staples. The patient was awakened and transferred to the recovery room in stable condition. Counts were correct and the urine was clear. COMPLICATIONS: None.

## 2019-12-05 NOTE — PROGRESS NOTES
12/05/19 1143   Clinical Encounter Type   Visited With Patient  (Pastors' wife was present by the bedside)   Routine Visit   (Responded to [facility] consult)   Mandaeism Encounters   Mandaeism Needs Prayer  (Per request, prayed and promoted an inner strengt

## 2019-12-05 NOTE — PROGRESS NOTES
Pre. Op. Note    Patient ready for [facility] - understands reasons, risks, complications, and alternative. All questions answered. 1 Unit p RBC's given yesterday for Hb = 7.   Good response in Hb post transfusion    Objective/Physical Exam:  General: Alert,

## 2019-12-05 NOTE — PLAN OF CARE
Pt requesting IVF with \"sugar\" for when she is npo after midnight. Above endorsed to Dr Dada Mckeon with orders received.

## 2019-12-05 NOTE — PLAN OF CARE
Patient is alert and oriented x3. Up ad sam. Voiding. Patient noted a large blood clot in toilet after voiding. She is unsure if it came from vagina or rectum. No active bleeding from either orifice. Skin remains intact.   NPO status maintained for proce intake (undernourished)  Description  INTERVENTIONS:  - Monitor percentage of each meal consumed  - Identify factors contributing to decreased intake, treat as appropriate  - Assist with meals as needed  - Monitor I&O, WT and lab values  - Obtain nutrition appropriate  - Initiate Pressure Ulcer prevention bundle as indicated  Outcome: Progressing     Problem: PAIN - ADULT  Goal: Verbalizes/displays adequate comfort level or patient's stated pain goal  Description  INTERVENTIONS:  - Encourage pt to monitor pa

## 2019-12-05 NOTE — PLAN OF CARE
Blood transfusion completed without incident/complication. Pt tolerated well. Repeat CBC in am. Dr. Trinh Covert paged for IVF orders while NPO for surgery. Param NICHOLAS to continue to monitor.

## 2019-12-05 NOTE — PLAN OF CARE
Problem: GASTROINTESTINAL - ADULT  Goal: Minimal or absence of nausea and vomiting  Description  INTERVENTIONS:  - Maintain adequate hydration with IV or PO as ordered and tolerated  - Nasogastric tube to low intermittent suction as ordered  - Evaluate e quadrants, no nausea, bm reported today, no c/o bloating, is passing flatus. Pt denies any discomfort when asked. Pt reports no difficulty with voiding, but only slight pain \"when I wait too long and my bladder is full. \"  Reports light pink vaginal disc

## 2019-12-05 NOTE — ANESTHESIA POSTPROCEDURE EVALUATION
[facility] Patient Status:  Inpatient   Age/Gender 45year old female MRN [id number]   Location [facility] Attending Genaro Bosch MD   Hosp Day # 5 PCP Martha Mendez MD       Anesthesia

## 2019-12-05 NOTE — PAYOR COMM NOTE
--------------  CONTINUED STAY REVIEW    Payor: [facility]  Subscriber #:  [id number]  Authorization Number: [id number]    Admit date: 11/30/19  Admit time: 4962    Admitting Physician: Vivian Geiger MD  Attending Physician:  Gee Galo, Chr

## 2019-12-05 NOTE — PROGRESS NOTES
INFECTIOUS DISEASE PROGRESS NOTE    Chinmay Negrete Patient Status:  Inpatient    10/8/1975 MRN [id number]   [facility] 3NW-A Attending Melisa Golden MD   Hosp Day # 5 PCP erythema. No open wounds.     Laboratory Data:    Recent Labs   Lab 11/30/19  0528  12/03/19  0529 12/04/19  0532 12/05/19  0529   RBC 2.49*   < > 2.45* 2.57* 3.24*   HGB 7.6*   < > 7.1* 7.5* 9.4*   HCT 23.6*   < > 23.0* 24.2* 30.6*   MCV 94.8   < > 93.9 94

## 2019-12-06 PROCEDURE — 85025 COMPLETE CBC W/AUTO DIFF WBC: CPT | Performed by: OBSTETRICS & GYNECOLOGY

## 2019-12-06 RX ORDER — IBUPROFEN 600 MG/1
600 TABLET ORAL EVERY 4 HOURS PRN
Status: DISCONTINUED | OUTPATIENT
Start: 2019-12-06 | End: 2019-12-09

## 2019-12-06 RX ORDER — HYDROCODONE BITARTRATE AND ACETAMINOPHEN 5; 325 MG/1; MG/1
1 TABLET ORAL EVERY 6 HOURS PRN
Status: DISCONTINUED | OUTPATIENT
Start: 2019-12-06 | End: 2019-12-09

## 2019-12-06 RX ORDER — SODIUM CHLORIDE, SODIUM LACTATE, POTASSIUM CHLORIDE, CALCIUM CHLORIDE 600; 310; 30; 20 MG/100ML; MG/100ML; MG/100ML; MG/100ML
INJECTION, SOLUTION INTRAVENOUS CONTINUOUS
Status: DISCONTINUED | OUTPATIENT
Start: 2019-12-06 | End: 2019-12-08

## 2019-12-06 NOTE — DIETARY NOTE
[address]     Admitting diagnosis:  fever, abdomenal pain. s/p surgical  Fever, unspecified fever cause    Ht:  5'1\"   Wt: 86.2 kg (190 lb). Body mass index is 35.9 kg/m².   Wt Readings from Last 6 Encou

## 2019-12-06 NOTE — PLAN OF CARE
VSS,afebrile. Tolerating clear liquids w/o issue. Reports moderate surgical pain; patrice pain meds & warm packs provided. Low transverse incision clean & intact w/ staples.   Patient attempted to ambulate: dangled at the side of the bed for 15 min& felt diz Patient/Family Goals  Goal: Patient/Family Short Term Goal  Description  Patient's Short Term Goal: PREPARE FOR DISCHARGE HOME    Interventions:   - ADVANCE DIET AS TOLERATED  -TRANSITION IV TO ORAL PAIN MEDICATION AND ANTIBIOTICS  -ENCOURAGE AMBULATION  -

## 2019-12-06 NOTE — PROGRESS NOTES
[facility]  Progress Note    Alie Zambrano Patient Status:  Inpatient    10/8/1975 MRN [id number]   [facility] [place] 3NW-A Attending Miah Bowman MD   Hosp Day # 6 PCP Apple Holland MD     Subjective:  POD: 1 - feels good.

## 2019-12-06 NOTE — PAYOR COMM NOTE
--------------  CONTINUED STAY REVIEW    Payor: [facility]  Subscriber #:  [id number]  Authorization Number: [id number]    Admit date: 11/30/19  Admit time: 4480    Admitting Physician: Andi Garcia MD  Attending Physician:  Carlos Alberto Tello, Chr

## 2019-12-06 NOTE — PROGRESS NOTES
INFECTIOUS DISEASE PROGRESS NOTE    Seth Esters Patient Status:  Inpatient    10/8/1975 MRN [id number]   [facility] 3NW-A Attending Poncho Anna MD   Hosp Day # 6 PCP Positive bowel sounds. Incision with c/d/d  Musculoskeletal: Full range of motion of all extremities. No swelling noted. Integument: No lesions. No erythema. No open wounds.     Laboratory Data:    Recent Labs   Lab 11/30/19  0528  12/04/19  0532 12/05/19

## 2019-12-06 NOTE — PLAN OF CARE
Patient is alert and oriented x3  Up ad sam in hallway x1 today  Up in chair most of day  Transverse incision to ABD is CDI  Voiding  Pain controlled  Tolerating CLD denies nausea or vomiting    Problem: Patient/Family Goals  Goal: Patient/Family Long Term appropriate  - Assist with meals as needed  - Monitor I&O, WT and lab values  - Obtain nutritional consult as needed  - Optimize oral hygiene and moisture  - Encourage food from home; allow for food preferences  - Enhance eating environment  Outcome: Progr WBC  - Administer growth factors as ordered  - Implement neutropenic guidelines  Outcome: Progressing     Problem: RISK FOR INFECTION - ADULT  Goal: Absence of fever/infection during anticipated neutropenic period  Description  INTERVENTIONS  - Monitor WBC

## 2019-12-07 PROCEDURE — 80048 BASIC METABOLIC PNL TOTAL CA: CPT | Performed by: OBSTETRICS & GYNECOLOGY

## 2019-12-07 PROCEDURE — 85025 COMPLETE CBC W/AUTO DIFF WBC: CPT | Performed by: OBSTETRICS & GYNECOLOGY

## 2019-12-07 NOTE — PROGRESS NOTES
[facility]  Progress Note    Tano Das Patient Status:  Inpatient    10/8/1975 MRN [id number]   [facility] 3NW-A Attending Stephanie Suh MD   Hosp Day # 7 PCP Rebeca Badillo MD     Subjective:  POD: 1 - feels good.

## 2019-12-08 PROCEDURE — 85025 COMPLETE CBC W/AUTO DIFF WBC: CPT | Performed by: OBSTETRICS & GYNECOLOGY

## 2019-12-08 NOTE — PROGRESS NOTES
INFECTIOUS DISEASE PROGRESS NOTE    Pedro Pablo Lomeli Patient Status:  Inpatient    10/8/1975 MRN [id number]   [facility] 3NW-A Attending Kimmie Durán MD   Hosp Day # 8 PCP Darwin Curran MD     Subjective:  Alert and up in ch better  - plan to cont IV abx while here, but would transition to PO abx (augmentin 875 mg po bid) for 7 more days once ready for d/c  D/w pt     Pernell Shaikh MD  [facility]  [phone]

## 2019-12-08 NOTE — PLAN OF CARE
Pt has been up ad sam, ambulating in halls. Tolerating soft diet. Will advance diet to gen tonight per Dr. Jeremiah Lopez. Abdo dressing dry and intact. Taking Motrin prn with adequate pain control. Continues to receive IV antibiotic.   Pt verbalized underst

## 2019-12-08 NOTE — PROGRESS NOTES
[facility]  Progress Note    Nahum Rowe Patient Status:  Inpatient    10/8/1975 MRN [id number]   [facility] 3NW-A Attending Kelly Carrasquillo MD   Hosp Day # 8 PCP Susan Aggarwal MD     Subjective:  POD:3 - feels good.

## 2019-12-08 NOTE — PLAN OF CARE
Pt states she is feeling better tonight, ambulated halls this evening, tolerating activity well. Pt states pain is being well managed with motrin and dilaudid for breakthrough pain, states she is tolerating diet and passing flatus, denies nausea.  Abdomen i as ordered and tolerated  - Evaluate effectiveness of GI medications  - Encourage mobilization and activity  - Obtain nutritional consult as needed  - Establish a toileting routine/schedule  - Consider collaborating with pharmacy to review patient's medica without S/S of infection  Description  INTERVENTIONS:  - Assess and document risk factors for pressure ulcer development  - Assess and document skin integrity  - Assess and document dressing/incision, wound bed, drain sites and surrounding tissue  - Implem

## 2019-12-09 VITALS
WEIGHT: 190 LBS | RESPIRATION RATE: 18 BRPM | HEART RATE: 78 BPM | BODY MASS INDEX: 36 KG/M2 | SYSTOLIC BLOOD PRESSURE: 120 MMHG | OXYGEN SATURATION: 98 % | DIASTOLIC BLOOD PRESSURE: 79 MMHG | TEMPERATURE: 98 F

## 2019-12-09 PROBLEM — R50.9 FEVER: Status: RESOLVED | Noted: 2019-11-30 | Resolved: 2019-12-09

## 2019-12-09 PROBLEM — Z98.890 S/P LAPAROSCOPY: Status: RESOLVED | Noted: 2019-11-21 | Resolved: 2019-12-09

## 2019-12-09 PROBLEM — D21.9 FIBROIDS: Status: RESOLVED | Noted: 2019-09-17 | Resolved: 2019-12-09

## 2019-12-09 PROBLEM — N92.1 MENORRHAGIA WITH IRREGULAR CYCLE: Status: RESOLVED | Noted: 2019-09-17 | Resolved: 2019-12-09

## 2019-12-09 PROBLEM — D25.1 INTRAMURAL LEIOMYOMA OF UTERUS: Status: RESOLVED | Noted: 2019-09-17 | Resolved: 2019-12-09

## 2019-12-09 RX ORDER — AMOXICILLIN AND CLAVULANATE POTASSIUM 875; 125 MG/1; MG/1
1 TABLET, FILM COATED ORAL EVERY 12 HOURS
Qty: 14 TABLET | Refills: 0 | Status: SHIPPED | OUTPATIENT
Start: 2019-12-09 | End: 2019-12-16

## 2019-12-09 RX ORDER — IBUPROFEN 600 MG/1
600 TABLET ORAL EVERY 4 HOURS PRN
Qty: 30 TABLET | Refills: 0 | Status: SHIPPED | OUTPATIENT
Start: 2019-12-09

## 2019-12-09 RX ORDER — HYDROCODONE BITARTRATE AND ACETAMINOPHEN 5; 325 MG/1; MG/1
1 TABLET ORAL EVERY 6 HOURS PRN
Qty: 16 TABLET | Refills: 0 | Status: SHIPPED | OUTPATIENT
Start: 2019-12-09

## 2019-12-09 NOTE — PLAN OF CARE
Written and verbal discharge instructions given to patient and  verbalize understanding. IV discontinued in , angio-cath tip intact, site free from redness, swelling, or drainage, patient denies pain at site. Dressing applied. Prescription given.   Patient

## 2019-12-09 NOTE — PLAN OF CARE
Problem: GASTROINTESTINAL - ADULT  Goal: Minimal or absence of nausea and vomiting  Description  INTERVENTIONS:  - Maintain adequate hydration with IV or PO as ordered and tolerated  - Nasogastric tube to low intermittent suction as ordered  - Evaluate e intact  Description  INTERVENTIONS  - Assess and document risk factors for pressure ulcer development  - Assess and document skin integrity  - Monitor for areas of redness and/or skin breakdown  - Initiate interventions, skin care algorithm/standards of ca

## 2019-12-09 NOTE — PAYOR COMM NOTE
--------------  CONTINUED STAY REVIEW    Payor: [address] [facility]  Subscriber #:  [id number]  Authorization Number: [id number]    Admit date: 11/30/19  Admit time: 1553    Admitting Physician: Solomon Johnson MD  Attending Physician:  Abdoulaye Ash, Chr infection  - rechecked cbc -improved  - diet per gyne, plan to advance slowly as bowel/ omentum adherent to uterus.  Doing better  - plan to cont IV abx while here, but would transition to PO abx (augmentin 875 mg po bid) for 7 more days once ready for d/c

## 2019-12-09 NOTE — PROGRESS NOTES
INFECTIOUS DISEASE PROGRESS NOTE    Esperanza Alcantara Patient Status:  Inpatient    10/8/1975 MRN [id number]   [facility] 3NW-A Attending Saumya Nina MD   Hosp Day # 9 PCP 8. 5* 7.7* 7.3*   HCT 26.9* 25.0* 23.3*   MCV 92.8 93.6 94.7   MCH 29.3 28.8 29.7   MCHC 31.6 30.8* 31.3   RDW 14.1 13.9 13.6   NEPRELIM 14.75* 9.51* 5.22   WBC 16.6* 11.3* 6.8   .0* 420.0 472.0*     Recent Labs   Lab 12/02/19  1409 12/07/19  0546

## 2019-12-09 NOTE — PROGRESS NOTES
[facility]  Progress Note    Consuelo Schuler Patient Status:  Inpatient    10/8/1975 MRN [id number]   [facility] 3NW-A Attending Jayme Trivedi MD   Hosp Day # 9 PCP Debo Wiggins MD     Subjective:  POD: 3  feels good.

## 2019-12-09 NOTE — PLAN OF CARE
Problem: GASTROINTESTINAL - ADULT  Goal: Minimal or absence of nausea and vomiting  Description  INTERVENTIONS:  - Maintain adequate hydration with IV or PO as ordered and tolerated  - Nasogastric tube to low intermittent suction as ordered  - Evaluate e drainage on peripad. Begun on soft diet today. Abdomen soft, rounded, bowel sounds present and active all quadrants, no nausea, is passing flatus, no belching, no bloating. Pt rates surgical pain 3 out of 10 and declines pain medication need at present.

## 2019-12-09 NOTE — PLAN OF CARE
ALL STAPLES REMOVED FROM ABD INCISION. SMALL OPENING NOTED, STERI SRIPS APPLIED. POC UPDATED, PT VERBALIZED UNDERSTANDING.

## 2019-12-10 NOTE — PAYOR COMM NOTE
--------------  DISCHARGE REVIEW    Payor: [facility]  Subscriber #:  [id number]  Authorization Number: [id number]    Admit date: 11/30/19  Admit time:  0318  Discharge Date: 12/9/2019  2:02 PM     Admitting Physician: Osbaldo Day MD

## 2019-12-11 NOTE — DISCHARGE SUMMARY
[facility]  Discharge Summary    [address] Patient Status:  Inpatient    10/8/1975 MRN [id number]   [facility] 3NW-A Attending No att. providers found   Hosp Day # 9 PCP Martha Mendez MD     Date of Admission: 2019 Historical    !! - Potential duplicate medications found. Please discuss with provider.       STOP taking these medications    Nitrofurantoin Monohyd Macro (MACROBID) 100 MG Oral Cap          Diet:  General    Activity:  Pelvic Rest and  routine post. Rigo Nixon

## 2019-12-13 PROBLEM — Z90.710 S/P TAH (TOTAL ABDOMINAL HYSTERECTOMY): Status: ACTIVE | Noted: 2019-12-13

## 2019-12-13 PROCEDURE — 87086 URINE CULTURE/COLONY COUNT: CPT | Performed by: OBSTETRICS & GYNECOLOGY

## 2019-12-27 ENCOUNTER — APPOINTMENT (OUTPATIENT)
Dept: LAB | Age: 44
End: 2019-12-27
Attending: OBSTETRICS & GYNECOLOGY
Payer: COMMERCIAL

## 2019-12-27 DIAGNOSIS — D50.0 ANEMIA DUE TO BLOOD LOSS, CHRONIC: ICD-10-CM

## 2019-12-27 PROCEDURE — 36415 COLL VENOUS BLD VENIPUNCTURE: CPT

## 2019-12-27 PROCEDURE — 85027 COMPLETE CBC AUTOMATED: CPT

## 2022-01-07 NOTE — PROGRESS NOTES
UP IN HALLWAY . USE OF I.S. 10X Q 1 HR. ENCOURAGED. Detail Level: Zone Note Text (......Xxx Chief Complaint.): This diagnosis correlates with the Other (Free Text): The treated area was degreased with pre-peel cleanser, and body vaseline was applied for protection of mucous membranes. Patient was given a chemical peel at 10%. Prior to the procedure, written consent was obtained and risks were reviewed, including but not limited to: redness, peeling, blistering, pigmentary change, scarring, infection, and pain. A chemical peel with 12.5% TCA was performed on the body. The peel was applied for 3 minutes. 3  coat(s) of 12.5% TCA were applied. Post-peel frosting was 1+. Post-peel erythema was none. After the procedure, the treatment area was washed with soap and water, and a post-peel cream was applied.  Sun protection and post-care instructions were reviewed with the Render Risk Assessment In Note?: no

## (undated) DEVICE — KENDALL SCD EXPRESS SLEEVES, KNEE LENGTH, MEDIUM: Brand: KENDALL SCD

## (undated) DEVICE — VIOLET BRAIDED (POLYGLACTIN 910), SYNTHETIC ABSORBABLE SUTURE: Brand: COATED VICRYL

## (undated) DEVICE — ABSORBABLE HEMOSTAT (OXIDIZED REGENERATED CELLULOSE, U.S.P.): Brand: SURGICEL

## (undated) DEVICE — SOL  .9 3000ML

## (undated) DEVICE — 40580 - THE PINK PAD - ADVANCED TRENDELENBURG POSITIONING KIT: Brand: 40580 - THE PINK PAD - ADVANCED TRENDELENBURG POSITIONING KIT

## (undated) DEVICE — GAMMEX® NON-LATEX PI TEXTURED SIZE 7.5, STERILE POLYISOPRENE POWDER-FREE SURGICAL GLOVE: Brand: GAMMEX

## (undated) DEVICE — SUTURE VICRYL 0

## (undated) DEVICE — TROCAR: Brand: KII FIOS FIRST ENTRY

## (undated) DEVICE — GOWN,SIRUS,FABRIC-REINFORCED,X-LARGE: Brand: MEDLINE

## (undated) DEVICE — LAPAROTOMY CDS: Brand: MEDLINE INDUSTRIES, INC.

## (undated) DEVICE — SOL  .9 1000ML BTL

## (undated) DEVICE — GAMMEX® PI HYBRID SIZE 8, STERILE POWDER-FREE SURGICAL GLOVE, POLYISOPRENE AND NEOPRENE BLEND: Brand: GAMMEX

## (undated) DEVICE — MANIPULATOR CATH ESCP KRNR

## (undated) DEVICE — PAD SANITARY 10\" STERILE

## (undated) DEVICE — SUTURE CHROMIC GUT 2-0 SH

## (undated) DEVICE — SUTURE VICRYL 0 CT-1

## (undated) DEVICE — 3M™ STERI-STRIP™ REINFORCED ADHESIVE SKIN CLOSURES, R1547, 1/2 IN X 4 IN (12 MM X 100 MM), 6 STRIPS/ENVELOPE: Brand: 3M™ STERI-STRIP™

## (undated) DEVICE — Device

## (undated) DEVICE — NON-ADHERENT PAD PREPACK: Brand: TELFA

## (undated) DEVICE — INSUFFLATION NEEDLE TO ESTABLISH PNEUMOPERITONEUM.: Brand: INSUFFLATION NEEDLE

## (undated) DEVICE — GAMMEX® PI HYBRID SIZE 7.5, STERILE POWDER-FREE SURGICAL GLOVE, POLYISOPRENE AND NEOPRENE BLEND: Brand: GAMMEX

## (undated) DEVICE — DERMABOND LIQUID ADHESIVE

## (undated) DEVICE — GYN LAP CDS: Brand: MEDLINE INDUSTRIES, INC.

## (undated) DEVICE — STAPLER SKIN INSORB 2030

## (undated) DEVICE — PANTS KNIT WASHABLE 2/3XL

## (undated) DEVICE — SUTURE VICRYL 2-0 UR-6

## (undated) DEVICE — SUTURE PDS LOOPED 60CM

## (undated) DEVICE — SPONGE STICK WITH PVP-I: Brand: KENDALL

## (undated) DEVICE — ELECTRO LUBE IS A SINGLE PATIENT USE DEVICE THAT IS INTENDED TO BE USED ON ELECTROSURGICAL ELECTRODES TO REDUCE STICKING.: Brand: KEY SURGICAL ELECTRO LUBE

## (undated) DEVICE — SUTURE MONOCRYL 4-0 PS-2

## (undated) DEVICE — UNDYED BRAIDED (POLYGLACTIN 910), SYNTHETIC ABSORBABLE SUTURE: Brand: COATED VICRYL

## (undated) NOTE — LETTER
Kathleen Coppola [address] [address]  [place], [address] [facility]    Consent for Operation  Date: __________________                                Time: _______________    1.  I authorize the performance upon [employer] the following operation:  Proc procedure has been videotaped, the surgeon will obtain the original videotape. The hospital will not be responsible for storage or maintenance of this tape.   7. For the purpose of advancing medical education, I consent to the admittance of observers to the STATEMENTS REQUIRING INSERTION OR COMPLETION WERE FILLED IN.     Signature of Patient:   ___________________________    When the patient is a minor or mentally incompetent to give consent:  Signature of person authorized to consent for patient: ____________ supplements, and pills I can buy without a prescription (including street drugs/illegal medications). Failure to inform my anesthesiologist about these medicines may increase my risk of anesthetic complications. iv.  If I am allergic to anything or have ha Anesthesiologist Signature     Date   Time  I have discussed the procedure and information above with the patient (or patient’s representative) and answered their questions. The patient or their representative has agreed to have anesthesia services.     ___

## (undated) NOTE — LETTER
Kathleen Coppola [address] [address]  [place], [address] [facility]    Consent for Operation  Date: __________________                                Time: _______________    1.  I authorize the performance upon [employer] the following operation:  Proc procedure has been videotaped, the surgeon will obtain the original videotape. The hospital will not be responsible for storage or maintenance of this tape.   7. For the purpose of advancing medical education, I consent to the admittance of observers to the STATEMENTS REQUIRING INSERTION OR COMPLETION WERE FILLED IN.     Signature of Patient:   ___________________________    When the patient is a minor or mentally incompetent to give consent:  Signature of person authorized to consent for patient: ____________ supplements, and pills I can buy without a prescription (including street drugs/illegal medications). Failure to inform my anesthesiologist about these medicines may increase my risk of anesthetic complications. iv.  If I am allergic to anything or have ha Anesthesiologist Signature     Date   Time  I have discussed the procedure and information above with the patient (or patient’s representative) and answered their questions. The patient or their representative has agreed to have anesthesia services.     ___

## (undated) NOTE — LETTER
Kathleen Coppola [address] [facility]  [place] [facility]    Consent for Operation  Date: __________________                                Time: _______________    1.  I authorize the performance upon [employer] the following operation:  Proc procedure has been videotaped, the surgeon will obtain the original videotape. The hospital will not be responsible for storage or maintenance of this tape.   7. For the purpose of advancing medical education, I consent to the admittance of observers to the STATEMENTS REQUIRING INSERTION OR COMPLETION WERE FILLED IN.     Signature of Patient:   ___________________________    When the patient is a minor or mentally incompetent to give consent:  Signature of person authorized to consent for patient: ____________ supplements, and pills I can buy without a prescription (including street drugs/illegal medications). Failure to inform my anesthesiologist about these medicines may increase my risk of anesthetic complications. iv.  If I am allergic to anything or have ha Anesthesiologist Signature     Date   Time  I have discussed the procedure and information above with the patient (or patient’s representative) and answered their questions. The patient or their representative has agreed to have anesthesia services.     ___

## (undated) NOTE — LETTER
[facility]  Phone: [phone]  Right Fax: [phone]  ABNORMAL VALUES FAX    Sent By:  Froilan Darnell RN Date: 11/19/19    Patient Name: Isiah Hernandez  Surgery Date: 11/21/2019    CSN: [id number]  Medical Record:

## (undated) NOTE — LETTER
Kathleen Coppola [address] [address]  [place], [address] [facility]    Consent for Operation  Date: __________________                                Time: _______________    1.  I authorize the performance upon [employer] the following operation:  Proc procedure has been videotaped, the surgeon will obtain the original videotape. The hospital will not be responsible for storage or maintenance of this tape.   7. For the purpose of advancing medical education, I consent to the admittance of observers to the STATEMENTS REQUIRING INSERTION OR COMPLETION WERE FILLED IN.     Signature of Patient:   ___________________________    When the patient is a minor or mentally incompetent to give consent:  Signature of person authorized to consent for patient: ____________ supplements, and pills I can buy without a prescription (including street drugs/illegal medications). Failure to inform my anesthesiologist about these medicines may increase my risk of anesthetic complications. iv.  If I am allergic to anything or have ha Anesthesiologist Signature     Date   Time  I have discussed the procedure and information above with the patient (or patient’s representative) and answered their questions. The patient or their representative has agreed to have anesthesia services.     ___